# Patient Record
Sex: FEMALE | Race: ASIAN
[De-identification: names, ages, dates, MRNs, and addresses within clinical notes are randomized per-mention and may not be internally consistent; named-entity substitution may affect disease eponyms.]

---

## 2017-01-31 PROBLEM — Z00.00 ENCOUNTER FOR PREVENTIVE HEALTH EXAMINATION: Status: ACTIVE | Noted: 2017-01-31

## 2017-02-02 ENCOUNTER — LABORATORY RESULT (OUTPATIENT)
Age: 77
End: 2017-02-02

## 2017-02-02 ENCOUNTER — APPOINTMENT (OUTPATIENT)
Dept: UROLOGY | Facility: CLINIC | Age: 77
End: 2017-02-02

## 2017-02-02 VITALS
WEIGHT: 152 LBS | OXYGEN SATURATION: 97 % | DIASTOLIC BLOOD PRESSURE: 65 MMHG | HEIGHT: 64 IN | SYSTOLIC BLOOD PRESSURE: 104 MMHG | RESPIRATION RATE: 17 BRPM | BODY MASS INDEX: 25.95 KG/M2 | HEART RATE: 64 BPM | TEMPERATURE: 97.9 F

## 2017-02-02 DIAGNOSIS — R73.03 PREDIABETES.: ICD-10-CM

## 2017-02-02 DIAGNOSIS — K80.00 CALCULUS OF GALLBLADDER WITH ACUTE CHOLECYSTITIS W/OUT OBSTRUCTION: ICD-10-CM

## 2017-02-02 DIAGNOSIS — I10 ESSENTIAL (PRIMARY) HYPERTENSION: ICD-10-CM

## 2017-02-02 DIAGNOSIS — E79.0 HYPERURICEMIA W/OUT SIGNS OF INFLAMMATORY ARTHRITIS AND TOPHACEOUS DISEASE: ICD-10-CM

## 2017-02-02 DIAGNOSIS — E78.5 HYPERLIPIDEMIA, UNSPECIFIED: ICD-10-CM

## 2017-02-02 RX ORDER — RALOXIFENE HYDROCHLORIDE 60 MG/1
60 TABLET ORAL
Refills: 0 | Status: ACTIVE | COMMUNITY

## 2017-02-02 RX ORDER — AMLODIPINE BESYLATE 5 MG/1
5 TABLET ORAL
Refills: 0 | Status: ACTIVE | COMMUNITY

## 2017-02-02 RX ORDER — VALSARTAN 160 MG/1
160 TABLET ORAL
Refills: 0 | Status: ACTIVE | COMMUNITY

## 2017-02-02 RX ORDER — FEBUXOSTAT 40 MG/1
40 TABLET ORAL
Refills: 0 | Status: ACTIVE | COMMUNITY

## 2017-02-02 RX ORDER — URSODIOL 300 MG/1
300 CAPSULE ORAL
Refills: 0 | Status: ACTIVE | COMMUNITY

## 2017-02-02 RX ORDER — SIMVASTATIN 10 MG/1
10 TABLET, FILM COATED ORAL
Refills: 0 | Status: ACTIVE | COMMUNITY

## 2017-02-02 RX ORDER — LORATADINE 10 MG/1
10 TABLET ORAL
Refills: 0 | Status: ACTIVE | COMMUNITY

## 2017-02-02 RX ORDER — OLOPATADINE HCL 1 MG/ML
0.1 SOLUTION/ DROPS OPHTHALMIC
Refills: 0 | Status: ACTIVE | COMMUNITY

## 2017-02-02 RX ORDER — ESOMEPRAZOLE MAGNESIUM 40 MG/1
40 CAPSULE, DELAYED RELEASE ORAL
Refills: 0 | Status: ACTIVE | COMMUNITY

## 2017-02-02 RX ORDER — COLCHICINE 0.6 MG/1
0.6 TABLET ORAL
Refills: 0 | Status: ACTIVE | COMMUNITY

## 2017-02-02 RX ORDER — ASPIRIN ENTERIC COATED TABLETS 81 MG 81 MG/1
81 TABLET, DELAYED RELEASE ORAL
Refills: 0 | Status: ACTIVE | COMMUNITY

## 2017-02-02 RX ORDER — FENOFIBRATE 134 MG/1
134 CAPSULE ORAL
Refills: 0 | Status: ACTIVE | COMMUNITY

## 2017-02-16 ENCOUNTER — APPOINTMENT (OUTPATIENT)
Dept: UROLOGY | Facility: CLINIC | Age: 77
End: 2017-02-16

## 2017-02-16 VITALS
HEIGHT: 64 IN | BODY MASS INDEX: 25.95 KG/M2 | SYSTOLIC BLOOD PRESSURE: 117 MMHG | DIASTOLIC BLOOD PRESSURE: 69 MMHG | RESPIRATION RATE: 18 BRPM | WEIGHT: 152 LBS | HEART RATE: 79 BPM | OXYGEN SATURATION: 96 %

## 2017-02-16 DIAGNOSIS — R31.21 ASYMPTOMATIC MICROSCOPIC HEMATURIA: ICD-10-CM

## 2017-02-16 LAB
APPEARANCE: ABNORMAL
BILIRUBIN URINE: NEGATIVE
BLOOD URINE: NEGATIVE
COLOR: YELLOW
GLUCOSE QUALITATIVE U: NORMAL MG/DL
KETONES URINE: NEGATIVE
LEUKOCYTE ESTERASE URINE: NEGATIVE
NITRITE URINE: NEGATIVE
PH URINE: 5
PROTEIN URINE: NEGATIVE MG/DL
SPECIFIC GRAVITY URINE: 1.03
UROBILINOGEN URINE: NORMAL MG/DL

## 2017-02-16 RX ORDER — CIPROFLOXACIN HYDROCHLORIDE 500 MG/1
500 TABLET, FILM COATED ORAL
Refills: 0 | Status: COMPLETED | OUTPATIENT
Start: 2017-02-16

## 2018-07-27 PROBLEM — R73.03 PREDIABETES: Status: RESOLVED | Noted: 2017-02-02 | Resolved: 2018-07-27

## 2018-12-24 PROBLEM — K80.00 CHOLELITHIASIS WITH ACUTE CHOLECYSTITIS: Status: RESOLVED | Noted: 2017-02-02 | Resolved: 2018-12-24

## 2021-09-09 PROBLEM — Z00.00 ENCOUNTER FOR PREVENTIVE HEALTH EXAMINATION: Status: ACTIVE | Noted: 2021-09-09

## 2021-09-16 ENCOUNTER — APPOINTMENT (OUTPATIENT)
Dept: NEUROSURGERY | Facility: CLINIC | Age: 81
End: 2021-09-16
Payer: MEDICARE

## 2021-09-16 VITALS
SYSTOLIC BLOOD PRESSURE: 124 MMHG | WEIGHT: 155 LBS | HEART RATE: 66 BPM | BODY MASS INDEX: 27.46 KG/M2 | OXYGEN SATURATION: 99 % | TEMPERATURE: 97.1 F | HEIGHT: 63 IN | DIASTOLIC BLOOD PRESSURE: 78 MMHG

## 2021-09-16 DIAGNOSIS — M48.00 SPINAL STENOSIS, SITE UNSPECIFIED: ICD-10-CM

## 2021-09-16 DIAGNOSIS — M54.9 DORSALGIA, UNSPECIFIED: ICD-10-CM

## 2021-09-16 PROCEDURE — 99203 OFFICE O/P NEW LOW 30 MIN: CPT

## 2021-09-20 PROBLEM — M48.00 SPINAL STENOSIS: Status: ACTIVE | Noted: 2021-09-20

## 2021-09-20 PROBLEM — M54.9 PAIN IN BACK: Status: ACTIVE | Noted: 2021-09-20

## 2021-09-20 NOTE — HISTORY OF PRESENT ILLNESS
[> 3 months] : more  than 3 months [FreeTextEntry1] : low back and leg pain [de-identified] : She has history of low back for about 5 yrs she had an injection and got better until about one year ago. She has numbness in both legs with the right worse. She takes gabapentin but helps only temporarily.She had 4 injections in the back but they did not help. The severity is about 6-7 out 10. She is able to walk about 10 minutes and has to stop. The legs feel sore, swollen and heavy. She has done PT with no help. She has no bowel nor bladder incontinence.

## 2021-09-20 NOTE — ASSESSMENT
[FreeTextEntry1] : She has stenosis and mild scoliosis but she can tolerate her symptoms now. I would continue PT and pain management and if the symptoms get worse we can discuss surgery.

## 2021-09-20 NOTE — PHYSICAL EXAM
[5] : S1 toe walking 5/5 [No Visual Abnormalities] : no visible abnormailities [No Scoliosis] : no scoliosis [Abnormal Walk] : normal gait [Straight-Leg Raise Test - Left] : straight leg raise of the left leg was negative [Straight-Leg Raise Test - Right] : straight leg raise  of the right leg was negative